# Patient Record
Sex: FEMALE | Race: BLACK OR AFRICAN AMERICAN | NOT HISPANIC OR LATINO
[De-identification: names, ages, dates, MRNs, and addresses within clinical notes are randomized per-mention and may not be internally consistent; named-entity substitution may affect disease eponyms.]

---

## 2021-08-03 ENCOUNTER — NON-APPOINTMENT (OUTPATIENT)
Age: 41
End: 2021-08-03

## 2021-08-03 PROBLEM — Z00.00 ENCOUNTER FOR PREVENTIVE HEALTH EXAMINATION: Status: ACTIVE | Noted: 2021-08-03

## 2021-08-09 ENCOUNTER — APPOINTMENT (OUTPATIENT)
Dept: PEDIATRIC MEDICAL GENETICS | Facility: CLINIC | Age: 41
End: 2021-08-09
Payer: COMMERCIAL

## 2021-08-09 DIAGNOSIS — N96 RECURRENT PREGNANCY LOSS: ICD-10-CM

## 2021-08-09 PROCEDURE — ZZZZZ: CPT

## 2021-08-17 ENCOUNTER — APPOINTMENT (OUTPATIENT)
Dept: ANTEPARTUM | Facility: CLINIC | Age: 41
End: 2021-08-17

## 2023-03-31 ENCOUNTER — OUTPATIENT (OUTPATIENT)
Dept: OUTPATIENT SERVICES | Facility: HOSPITAL | Age: 43
LOS: 1 days | End: 2023-03-31
Payer: COMMERCIAL

## 2023-03-31 VITALS
SYSTOLIC BLOOD PRESSURE: 130 MMHG | RESPIRATION RATE: 18 BRPM | HEART RATE: 105 BPM | DIASTOLIC BLOOD PRESSURE: 86 MMHG | HEIGHT: 62 IN | WEIGHT: 173.06 LBS | TEMPERATURE: 98 F | OXYGEN SATURATION: 96 %

## 2023-03-31 DIAGNOSIS — Z98.890 OTHER SPECIFIED POSTPROCEDURAL STATES: Chronic | ICD-10-CM

## 2023-03-31 DIAGNOSIS — Z34.90 ENCOUNTER FOR SUPERVISION OF NORMAL PREGNANCY, UNSPECIFIED, UNSPECIFIED TRIMESTER: ICD-10-CM

## 2023-03-31 DIAGNOSIS — O34.29 MATERNAL CARE DUE TO UTERINE SCAR FROM OTHER PREVIOUS SURGERY: ICD-10-CM

## 2023-03-31 PROCEDURE — 86901 BLOOD TYPING SEROLOGIC RH(D): CPT

## 2023-03-31 PROCEDURE — 86900 BLOOD TYPING SEROLOGIC ABO: CPT

## 2023-03-31 PROCEDURE — 83036 HEMOGLOBIN GLYCOSYLATED A1C: CPT

## 2023-03-31 PROCEDURE — 86850 RBC ANTIBODY SCREEN: CPT

## 2023-03-31 PROCEDURE — G0463: CPT

## 2023-03-31 PROCEDURE — 85027 COMPLETE CBC AUTOMATED: CPT

## 2023-03-31 PROCEDURE — 80048 BASIC METABOLIC PNL TOTAL CA: CPT

## 2023-03-31 RX ORDER — CHLORHEXIDINE GLUCONATE 213 G/1000ML
1 SOLUTION TOPICAL ONCE
Refills: 0 | Status: DISCONTINUED | OUTPATIENT
Start: 2023-04-19 | End: 2023-04-22

## 2023-03-31 RX ORDER — SODIUM CHLORIDE 9 MG/ML
1000 INJECTION, SOLUTION INTRAVENOUS
Refills: 0 | Status: DISCONTINUED | OUTPATIENT
Start: 2023-04-19 | End: 2023-04-19

## 2023-03-31 NOTE — OB PST NOTE - HISTORY OF PRESENT ILLNESS
42yr old female  EDC 2023 coming in for primary  due to previous myomectomy. Pt states her gestational DM is diet controlled. Denies HTN or complications feels well has acid reflux, No sig med hx. Had covid 2022 fever and body aches.    Note; covid test 2023 [facility]

## 2023-03-31 NOTE — OB PST NOTE - ASSESSMENT
CAPRINI SCORE [CLOT]    AGE RELATED RISK FACTORS                                                       MOBILITY RELATED FACTORS  [x ] Age 41-60 years                                            (1 Point)                  [ ] Bed rest                                                        (1 Point)  [ ] Age: 61-74 years                                           (2 Points)                 [ ] Plaster cast                                                   (2 Points)  [ ] Age= 75 years                                              (3 Points)                 [ ] Bed bound for more than 72 hours                 (2 Points)    DISEASE RELATED RISK FACTORS                                               GENDER SPECIFIC FACTORS  [ ] Edema in the lower extremities                       (1 Point)                  [x ] Pregnancy                                                     (1 Point)  [ ] Varicose veins                                               (1 Point)                  [ ] Post-partum < 6 weeks                                   (1 Point)             [x ] BMI > 25 Kg/m2                                            (1 Point)                  [ ] Hormonal therapy  or oral contraception          (1 Point)                 [ ] Sepsis (in the previous month)                        (1 Point)                  [ ] History of pregnancy complications                 (1 point)  [ ] Pneumonia or serious lung disease                                               [ ] Unexplained or recurrent                     (1 Point)           (in the previous month)                               (1 Point)  [ ] Abnormal pulmonary function test                     (1 Point)                 SURGERY RELATED RISK FACTORS  [ ] Acute myocardial infarction                              (1 Point)                 x[ ]  Section                                             (1 Point)  [ ] Congestive heart failure (in the previous month)  (1 Point)               [ ] Minor surgery                                                  (1 Point)   [ ] Inflammatory bowel disease                             (1 Point)                 [ ] Arthroscopic surgery                                        (2 Points)  [ ] Central venous access                                      (2 Points)                [ ] General surgery lasting more than 45 minutes   (2 Points)       [ ] Stroke (in the previous month)                          (5 Points)               [ ] Elective arthroplasty                                         (5 Points)                                                                                                                                               HEMATOLOGY RELATED FACTORS                                                 TRAUMA RELATED RISK FACTORS  [ ] Prior episodes of VTE                                     (3 Points)                [ ] Fracture of the hip, pelvis, or leg                       (5 Points)  [ ] Positive family history for VTE                         (3 Points)                 [ ] Acute spinal cord injury (in the previous month)  (5 Points)  [ ] Prothrombin 76734 A                                     (3 Points)                 [ ] Paralysis  (less than 1 month)                             (5 Points)  [ ] Factor V Leiden                                             (3 Points)                  [ ] Multiple Trauma within 1 month                        (5 Points)  [ ] Lupus anticoagulants                                     (3 Points)                                                           [ ] Anticardiolipin antibodies                               (3 Points)                                                       [ ] High homocysteine in the blood                      (3 Points)                                             [ ] Other congenital or acquired thrombophilia      (3 Points)                                                [ ] Heparin induced thrombocytopenia                  (3 Points)                                          Total Score [   3       ]    Caprini Score 0 - 2:  Low Risk, No VTE Prophylaxis required for most patients, encourage ambulation  Caprini Score 3 - 6:  At Risk, pharmacologic VTE prophylaxis is indicated for most patients (in the absence of a contraindication)  Caprini Score Greater than or = 7:  High Risk, pharmacologic VTE prophylaxis is indicated for most patients (in the absence of a contraindication)

## 2023-03-31 NOTE — OB PST NOTE - NSICDXPASTSURGICALHX_GEN_ALL_CORE_FT
PAST SURGICAL HISTORY:  History of D&C     History of myomectomy     History of repair of ACL     [place] product of in vitro fertilization (IVF) pregnancy

## 2023-04-01 ENCOUNTER — TRANSCRIPTION ENCOUNTER (OUTPATIENT)
Age: 43
End: 2023-04-01

## 2023-04-18 ENCOUNTER — TRANSCRIPTION ENCOUNTER (OUTPATIENT)
Age: 43
End: 2023-04-18

## 2023-04-19 ENCOUNTER — INPATIENT (INPATIENT)
Facility: HOSPITAL | Age: 43
LOS: 2 days | Discharge: HOME | End: 2023-04-22
Attending: OBSTETRICS & GYNECOLOGY | Admitting: OBSTETRICS & GYNECOLOGY
Payer: COMMERCIAL

## 2023-04-19 VITALS — HEART RATE: 100 BPM | DIASTOLIC BLOOD PRESSURE: 85 MMHG | SYSTOLIC BLOOD PRESSURE: 133 MMHG

## 2023-04-19 DIAGNOSIS — Z98.890 OTHER SPECIFIED POSTPROCEDURAL STATES: Chronic | ICD-10-CM

## 2023-04-19 DIAGNOSIS — O34.29 MATERNAL CARE DUE TO UTERINE SCAR FROM OTHER PREVIOUS SURGERY: ICD-10-CM

## 2023-04-19 LAB — GLUCOSE BLDC GLUCOMTR-MCNC: 74 MG/DL — SIGNIFICANT CHANGE UP (ref 70–99)

## 2023-04-19 PROCEDURE — 88307 TISSUE EXAM BY PATHOLOGIST: CPT | Mod: 26

## 2023-04-19 PROCEDURE — 59514 CESAREAN DELIVERY ONLY: CPT | Mod: AS,U7

## 2023-04-19 DEVICE — INTERCEED 3 X 4": Type: IMPLANTABLE DEVICE | Status: FUNCTIONAL

## 2023-04-19 RX ORDER — ACETAMINOPHEN 500 MG
1000 TABLET ORAL ONCE
Refills: 0 | Status: COMPLETED | OUTPATIENT
Start: 2023-04-19 | End: 2023-04-19

## 2023-04-19 RX ORDER — OXYCODONE HYDROCHLORIDE 5 MG/1
10 TABLET ORAL
Refills: 0 | Status: DISCONTINUED | OUTPATIENT
Start: 2023-04-19 | End: 2023-04-20

## 2023-04-19 RX ORDER — NALOXONE HYDROCHLORIDE 4 MG/.1ML
0.1 SPRAY NASAL
Refills: 0 | Status: DISCONTINUED | OUTPATIENT
Start: 2023-04-19 | End: 2023-04-20

## 2023-04-19 RX ORDER — ACETAMINOPHEN 500 MG
975 TABLET ORAL
Refills: 0 | Status: DISCONTINUED | OUTPATIENT
Start: 2023-04-19 | End: 2023-04-22

## 2023-04-19 RX ORDER — FAMOTIDINE 10 MG/ML
20 INJECTION INTRAVENOUS ONCE
Refills: 0 | Status: COMPLETED | OUTPATIENT
Start: 2023-04-19 | End: 2023-04-19

## 2023-04-19 RX ORDER — LANOLIN
1 OINTMENT (GRAM) TOPICAL EVERY 6 HOURS
Refills: 0 | Status: DISCONTINUED | OUTPATIENT
Start: 2023-04-19 | End: 2023-04-22

## 2023-04-19 RX ORDER — IBUPROFEN 200 MG
600 TABLET ORAL EVERY 6 HOURS
Refills: 0 | Status: DISCONTINUED | OUTPATIENT
Start: 2023-04-19 | End: 2023-04-22

## 2023-04-19 RX ORDER — DEXAMETHASONE 0.5 MG/5ML
4 ELIXIR ORAL EVERY 6 HOURS
Refills: 0 | Status: DISCONTINUED | OUTPATIENT
Start: 2023-04-19 | End: 2023-04-20

## 2023-04-19 RX ORDER — SODIUM CHLORIDE 9 MG/ML
1000 INJECTION, SOLUTION INTRAVENOUS
Refills: 0 | Status: DISCONTINUED | OUTPATIENT
Start: 2023-04-19 | End: 2023-04-22

## 2023-04-19 RX ORDER — OXYCODONE HYDROCHLORIDE 5 MG/1
5 TABLET ORAL
Refills: 0 | Status: DISCONTINUED | OUTPATIENT
Start: 2023-04-19 | End: 2023-04-20

## 2023-04-19 RX ORDER — TETANUS TOXOID, REDUCED DIPHTHERIA TOXOID AND ACELLULAR PERTUSSIS VACCINE, ADSORBED 5; 2.5; 8; 8; 2.5 [IU]/.5ML; [IU]/.5ML; UG/.5ML; UG/.5ML; UG/.5ML
0.5 SUSPENSION INTRAMUSCULAR ONCE
Refills: 0 | Status: DISCONTINUED | OUTPATIENT
Start: 2023-04-19 | End: 2023-04-22

## 2023-04-19 RX ORDER — SIMETHICONE 80 MG/1
80 TABLET, CHEWABLE ORAL EVERY 4 HOURS
Refills: 0 | Status: DISCONTINUED | OUTPATIENT
Start: 2023-04-19 | End: 2023-04-22

## 2023-04-19 RX ORDER — MAGNESIUM HYDROXIDE 400 MG/1
30 TABLET, CHEWABLE ORAL
Refills: 0 | Status: DISCONTINUED | OUTPATIENT
Start: 2023-04-19 | End: 2023-04-22

## 2023-04-19 RX ORDER — OXYTOCIN 10 UNIT/ML
333.33 VIAL (ML) INJECTION
Qty: 20 | Refills: 0 | Status: COMPLETED | OUTPATIENT
Start: 2023-04-19 | End: 2023-04-19

## 2023-04-19 RX ORDER — HEPARIN SODIUM 5000 [USP'U]/ML
5000 INJECTION INTRAVENOUS; SUBCUTANEOUS EVERY 12 HOURS
Refills: 0 | Status: DISCONTINUED | OUTPATIENT
Start: 2023-04-19 | End: 2023-04-22

## 2023-04-19 RX ORDER — CITRIC ACID/SODIUM CITRATE 300-500 MG
15 SOLUTION, ORAL ORAL ONCE
Refills: 0 | Status: COMPLETED | OUTPATIENT
Start: 2023-04-19 | End: 2023-04-19

## 2023-04-19 RX ORDER — KETOROLAC TROMETHAMINE 30 MG/ML
30 SYRINGE (ML) INJECTION ONCE
Refills: 0 | Status: DISCONTINUED | OUTPATIENT
Start: 2023-04-19 | End: 2023-04-19

## 2023-04-19 RX ORDER — NALBUPHINE HYDROCHLORIDE 10 MG/ML
2.5 INJECTION, SOLUTION INTRAMUSCULAR; INTRAVENOUS; SUBCUTANEOUS EVERY 6 HOURS
Refills: 0 | Status: DISCONTINUED | OUTPATIENT
Start: 2023-04-19 | End: 2023-04-20

## 2023-04-19 RX ORDER — OXYTOCIN 10 UNIT/ML
333.33 VIAL (ML) INJECTION
Qty: 20 | Refills: 0 | Status: DISCONTINUED | OUTPATIENT
Start: 2023-04-19 | End: 2023-04-22

## 2023-04-19 RX ORDER — MORPHINE SULFATE 50 MG/1
0.1 CAPSULE, EXTENDED RELEASE ORAL ONCE
Refills: 0 | Status: DISCONTINUED | OUTPATIENT
Start: 2023-04-19 | End: 2023-04-20

## 2023-04-19 RX ORDER — CEFAZOLIN SODIUM 1 G
2000 VIAL (EA) INJECTION ONCE
Refills: 0 | Status: COMPLETED | OUTPATIENT
Start: 2023-04-19 | End: 2023-04-19

## 2023-04-19 RX ORDER — BUTORPHANOL TARTRATE 2 MG/ML
0.12 INJECTION, SOLUTION INTRAMUSCULAR; INTRAVENOUS EVERY 6 HOURS
Refills: 0 | Status: DISCONTINUED | OUTPATIENT
Start: 2023-04-19 | End: 2023-04-20

## 2023-04-19 RX ORDER — IBUPROFEN 200 MG
600 TABLET ORAL EVERY 6 HOURS
Refills: 0 | Status: COMPLETED | OUTPATIENT
Start: 2023-04-19 | End: 2024-03-17

## 2023-04-19 RX ORDER — DIPHENHYDRAMINE HCL 50 MG
25 CAPSULE ORAL EVERY 6 HOURS
Refills: 0 | Status: DISCONTINUED | OUTPATIENT
Start: 2023-04-19 | End: 2023-04-22

## 2023-04-19 RX ORDER — SODIUM CHLORIDE 9 MG/ML
1000 INJECTION, SOLUTION INTRAVENOUS ONCE
Refills: 0 | Status: COMPLETED | OUTPATIENT
Start: 2023-04-19 | End: 2023-04-19

## 2023-04-19 RX ORDER — ONDANSETRON 8 MG/1
4 TABLET, FILM COATED ORAL EVERY 6 HOURS
Refills: 0 | Status: DISCONTINUED | OUTPATIENT
Start: 2023-04-19 | End: 2023-04-20

## 2023-04-19 RX ADMIN — Medication 15 MILLILITER(S): at 10:19

## 2023-04-19 RX ADMIN — Medication 1000 MILLIUNIT(S)/MIN: at 14:54

## 2023-04-19 RX ADMIN — FAMOTIDINE 20 MILLIGRAM(S): 10 INJECTION INTRAVENOUS at 10:19

## 2023-04-19 RX ADMIN — ONDANSETRON 4 MILLIGRAM(S): 8 TABLET, FILM COATED ORAL at 12:53

## 2023-04-19 RX ADMIN — Medication 600 MILLIGRAM(S): at 20:50

## 2023-04-19 RX ADMIN — Medication 400 MILLIGRAM(S): at 14:55

## 2023-04-19 RX ADMIN — ONDANSETRON 4 MILLIGRAM(S): 8 TABLET, FILM COATED ORAL at 20:20

## 2023-04-19 RX ADMIN — Medication 30 MILLIGRAM(S): at 12:53

## 2023-04-19 RX ADMIN — Medication 600 MILLIGRAM(S): at 20:20

## 2023-04-19 RX ADMIN — SODIUM CHLORIDE 2000 MILLILITER(S): 9 INJECTION, SOLUTION INTRAVENOUS at 10:19

## 2023-04-19 RX ADMIN — HEPARIN SODIUM 5000 UNIT(S): 5000 INJECTION INTRAVENOUS; SUBCUTANEOUS at 20:19

## 2023-04-19 RX ADMIN — SODIUM CHLORIDE 125 MILLILITER(S): 9 INJECTION, SOLUTION INTRAVENOUS at 10:19

## 2023-04-19 NOTE — OB PROVIDER H&P - HISTORY OF PRESENT ILLNESS
42yr old female  EDC 2023 coming in for primary  due to previous myomectomy. Pt states her gestational DM is diet controlled. Denies HTN or complications feels well has acid reflux, No sig med hx. Had covid 2022 fever and body aches.    Note; covid test 2023 [facility]  -----------------------------------------  PA Note:  42y  @38wks and 4 days gestation presenting for scheduled pLTCS due to prior myomectomy in 2018. She reports feeling irregular cramping. She denies LOF or VB. PNC c/b GDMA1. +FM. GBS +. EFW 6#9 done by ultrasound last week. To note, this is an IVF pregnancy.     POBHx: SAB x2 s/p D&C x1  PGYNHx: Denies ovarian cysts, abnormal pap smears. STD's  PMHx: Denies  Medications: PNV, ASA  Allergies: NKDA  PShx: Lsc myomectomy in 2018, R ACL repair   Social Hx: Denies etoh/tobacco/drug use. Denies anxiety/depression    Vital Signs Last 24 Hrs  T(C): --  T(F): --  HR: 88 (2023 09:52) (82 - 100)  BP: 133/85 (2023 09:09) (133/85 - 133/85)  BP(mean): --  RR: --  SpO2: 99% (2023 09:52) (97% - 100%)    General: NAD, A&Ox3  CV: RRR  Lungs: CTA b/l  Abdomen: Soft, NT, gravid    VE: Deferred  EFM: 130/moderate variability/+accels/no decels  Ashkum: Irregular

## 2023-04-19 NOTE — OB PROVIDER H&P - NSHPPHYSICALEXAM_GEN_ALL_CORE
Vital Signs Last 24 Hrs  T(C): --  T(F): --  HR: 88 (19 Apr 2023 09:52) (82 - 100)  BP: 133/85 (19 Apr 2023 09:09) (133/85 - 133/85)  BP(mean): --  RR: --  SpO2: 99% (19 Apr 2023 09:52) (97% - 100%)    General: NAD, A&Ox3  CV: RRR  Lungs: CTA b/l  Abdomen: Soft, NT, gravid

## 2023-04-19 NOTE — OB PROVIDER DELIVERY SUMMARY - NSPROVIDERDELIVERYNOTE_OBGYN_ALL_OB_FT
pLTCS for prior myomectomy performed in the usual fashion  Viable male infant, vertex presentation, apgars 9/9, weight 6#15  Focal accreta noted in posterior wall of the uterus. Box stitch with Monocryl placed  Methergine x1 given   Hysterotomy then closed in 1 layer using PDS  Interceed and fibrillar placed over hysterotomy  Excellent hemostasis noted  Grossly normal uterus, tubes and ovaries  Abdomen closed in standard fashion  Skin closed with insorb with prineo dressing   Pt and infant to recovery in stable condition  Placenta to pathology  Count correct x3    QBL: 331cc  UOP: 350cc  IVF: 2000cc    Dictation #:    Performed with surgeon Dr. Preet Colmenares PA-C pLTCS sec to hx prior myomectomy, performed in the usual fashion.  Viable male infant, vertex presentation, apgars 9/9, weight 6#15  Focal accreta noted in posterior wall of the uterus. Box stitch with Monocryl placed  Methergine x1 given   Hysterotomy then closed in 1 layer using PDS  Interceed and fibrillar placed over hysterotomy  Excellent hemostasis noted  Grossly normal uterus, tubes and ovaries  Abdomen closed in standard fashion  Skin closed with insorb with prineo dressing   Pt and infant to recovery in stable condition  Placenta to pathology  Count correct x3    QBL: 331cc  UOP: 350cc  IVF: 2000cc    Dictation #:    Performed with surgeon Dr. Preet Colmenares PA-C pLTCS sec to hx prior myomectomy, performed in the usual fashion.  Viable male infant, vertex presentation, apgars 9/9, weight 6#15  Focal accreta noted in posterior wall of the uterus. Box stitch with Monocryl placed  Methergine x1 given   Hysterotomy then closed in 1 layer using PDS  Interceed and fibrillar placed over hysterotomy  Excellent hemostasis noted  Grossly normal uterus, tubes and ovaries  Abdomen closed in standard fashion  Skin closed with insorb with prineo dressing   Pt and infant to recovery in stable condition  Placenta to pathology  Count correct x3    QBL: 331cc  UOP: 350cc  IVF: 2000cc    Dictation #: [id number]    Performed with surgeon Dr. Preet Colmenares PA-C

## 2023-04-19 NOTE — OB RN DELIVERY SUMMARY - NS_SEPSISRSKCALC_OBGYN_ALL_OB_FT
EOS calculated successfully. EOS Risk Factor: 0.5/1000 live births ([facility] national incidence); GA=38w4d; Temp=97.9; ROM=0.017; GBS='Positive'; Antibiotics='No antibiotics or any antibiotics < 2 hrs prior to birth'

## 2023-04-19 NOTE — OB PROVIDER H&P - ASSESSMENT
A/P:  42y  @38wks and 4 days gestation presenting for scheduled pLTCS. +FM. GBS +. EFW 3000g  -Admit to L&D  -Routine labs  -EFM/[place]  -NPO, IVF, Bicitra  -Anesthesia consult  -For pLTCS  Dr. Oviedo in house  Anne Marie Colmenares PA-C

## 2023-04-19 NOTE — OB RN PATIENT PROFILE - FUNCTIONAL ASSESSMENT - BASIC MOBILITY 6.
4-calculated by average/Not able to assess (calculate score using [facility] averaging method)

## 2023-04-19 NOTE — OB RN INTRAOPERATIVE NOTE - NSSELHIDDEN_OBGYN_ALL_OB_FT
[NS_DeliveryAttending1_OBGYN_ALL_OB_FT:[id number]],[NS_DeliveryAssist1_OBGYN_ALL_OB_FT:[id number]=],[NS_DeliveryAssist2_OBGYN_ALL_OB_FT:[id number]=],[NS_DeliveryRN_OBGYN_ALL_OB_FT:[id number]]

## 2023-04-19 NOTE — OB PROVIDER DELIVERY SUMMARY - NSSELHIDDEN_OBGYN_ALL_OB_FT
[NS_DeliveryAttending1_OBGYN_ALL_OB_FT:[id number]=],[NS_DeliveryAssist1_OBGYN_ALL_OB_FT:[id number]=]

## 2023-04-19 NOTE — OB PROVIDER H&P - ATTENDING COMMENTS
41 yo nullip at term adm for elective prim C/S sec to hx myomectomy.  APC compl by GDMA1 well controlled, no sig PMHx.    Pros/cons of a LEXA in setting of a previous laparoscopic myomectomy of a large fibroid have been reviewed in detail, and pt elected to proceed to prim C/S.  Risks of C/S reviewed in detail, plan anesthesia consult for CSE, then proceed to OR.  WIL Oviedo

## 2023-04-19 NOTE — OB RN PATIENT PROFILE - NSICDXPASTSURGICALHX_GEN_ALL_CORE_FT
PAST SURGICAL HISTORY:  History of D&C     History of myomectomy     History of repair of ACL     [place] product of in vitro fertilization (IVF) pregnancy

## 2023-04-19 NOTE — OB RN DELIVERY SUMMARY - NSSELHIDDEN_OBGYN_ALL_OB_FT
[NS_DeliveryAttending1_OBGYN_ALL_OB_FT:[id number]=],[NS_DeliveryAssist1_OBGYN_ALL_OB_FT:[id number]=],[NS_DeliveryAssist2_OBGYN_ALL_OB_FT:[id number]=],[NS_DeliveryRN_OBGYN_ALL_OB_FT:[id number]==]

## 2023-04-19 NOTE — OB PROVIDER H&P - NSICDXPASTSURGICALHX_GEN_ALL_CORE_FT
PAST SURGICAL HISTORY:  History of D&C     History of myomectomy     History of repair of ACL     [place] product of in vitro fertilization (IVF) pregnancy

## 2023-04-20 LAB
BASOPHILS # BLD AUTO: 0.06 K/UL — SIGNIFICANT CHANGE UP (ref 0–0.2)
BASOPHILS NFR BLD AUTO: 0.4 % — SIGNIFICANT CHANGE UP (ref 0–2)
EOSINOPHIL # BLD AUTO: 0.08 K/UL — SIGNIFICANT CHANGE UP (ref 0–0.5)
EOSINOPHIL NFR BLD AUTO: 0.5 % — SIGNIFICANT CHANGE UP (ref 0–6)
HCT VFR BLD CALC: 37.6 % — SIGNIFICANT CHANGE UP (ref 34.5–45)
HGB BLD-MCNC: 12.4 G/DL — SIGNIFICANT CHANGE UP (ref 11.5–15.5)
IMM GRANULOCYTES NFR BLD AUTO: 0.5 % — SIGNIFICANT CHANGE UP (ref 0–0.9)
LYMPHOCYTES # BLD AUTO: 16.9 % — SIGNIFICANT CHANGE UP (ref 13–44)
LYMPHOCYTES # BLD AUTO: 2.46 K/UL — SIGNIFICANT CHANGE UP (ref 1–3.3)
MCHC RBC-ENTMCNC: 32.5 PG — SIGNIFICANT CHANGE UP (ref 27–34)
MCHC RBC-ENTMCNC: 33 GM/DL — SIGNIFICANT CHANGE UP (ref 32–36)
MCV RBC AUTO: 98.7 FL — SIGNIFICANT CHANGE UP (ref 80–100)
MONOCYTES # BLD AUTO: 0.99 K/UL — HIGH (ref 0–0.9)
MONOCYTES NFR BLD AUTO: 6.8 % — SIGNIFICANT CHANGE UP (ref 2–14)
NEUTROPHILS # BLD AUTO: 10.9 K/UL — HIGH (ref 1.8–7.4)
NEUTROPHILS NFR BLD AUTO: 74.9 % — SIGNIFICANT CHANGE UP (ref 43–77)
NRBC # BLD: 0 /100 WBCS — SIGNIFICANT CHANGE UP (ref 0–0)
PLATELET # BLD AUTO: 284 K/UL — SIGNIFICANT CHANGE UP (ref 150–400)
RBC # BLD: 3.81 M/UL — SIGNIFICANT CHANGE UP (ref 3.8–5.2)
RBC # FLD: 13.3 % — SIGNIFICANT CHANGE UP (ref 10.3–14.5)
WBC # BLD: 14.56 K/UL — HIGH (ref 3.8–10.5)
WBC # FLD AUTO: 14.56 K/UL — HIGH (ref 3.8–10.5)

## 2023-04-20 RX ORDER — KETOROLAC TROMETHAMINE 30 MG/ML
30 SYRINGE (ML) INJECTION ONCE
Refills: 0 | Status: DISCONTINUED | OUTPATIENT
Start: 2023-04-20 | End: 2023-04-20

## 2023-04-20 RX ADMIN — Medication 975 MILLIGRAM(S): at 00:36

## 2023-04-20 RX ADMIN — Medication 600 MILLIGRAM(S): at 21:32

## 2023-04-20 RX ADMIN — Medication 975 MILLIGRAM(S): at 05:53

## 2023-04-20 RX ADMIN — Medication 600 MILLIGRAM(S): at 16:00

## 2023-04-20 RX ADMIN — Medication 600 MILLIGRAM(S): at 22:00

## 2023-04-20 RX ADMIN — Medication 975 MILLIGRAM(S): at 01:06

## 2023-04-20 RX ADMIN — Medication 30 MILLIGRAM(S): at 09:23

## 2023-04-20 RX ADMIN — Medication 600 MILLIGRAM(S): at 15:39

## 2023-04-20 RX ADMIN — HEPARIN SODIUM 5000 UNIT(S): 5000 INJECTION INTRAVENOUS; SUBCUTANEOUS at 22:12

## 2023-04-20 RX ADMIN — Medication 975 MILLIGRAM(S): at 17:16

## 2023-04-20 RX ADMIN — SIMETHICONE 80 MILLIGRAM(S): 80 TABLET, CHEWABLE ORAL at 21:33

## 2023-04-20 RX ADMIN — Medication 975 MILLIGRAM(S): at 06:23

## 2023-04-20 RX ADMIN — Medication 30 MILLIGRAM(S): at 10:00

## 2023-04-20 RX ADMIN — Medication 975 MILLIGRAM(S): at 12:00

## 2023-04-20 RX ADMIN — HEPARIN SODIUM 5000 UNIT(S): 5000 INJECTION INTRAVENOUS; SUBCUTANEOUS at 10:50

## 2023-04-20 RX ADMIN — Medication 975 MILLIGRAM(S): at 11:14

## 2023-04-20 NOTE — PROVIDER CONTACT NOTE (OTHER) - ASSESSMENT
Pt AXOX4, denies chest pain, SOB, and palpitations. C/o of "lightheadedness here and there, it's better than before."

## 2023-04-21 RX ORDER — OXYCODONE HYDROCHLORIDE 5 MG/1
5 TABLET ORAL
Refills: 0 | Status: COMPLETED | OUTPATIENT
Start: 2023-04-21 | End: 2023-04-28

## 2023-04-21 RX ORDER — OXYCODONE HYDROCHLORIDE 5 MG/1
5 TABLET ORAL
Refills: 0 | Status: DISCONTINUED | OUTPATIENT
Start: 2023-04-21 | End: 2023-04-22

## 2023-04-21 RX ORDER — OXYCODONE HYDROCHLORIDE 5 MG/1
5 TABLET ORAL ONCE
Refills: 0 | Status: DISCONTINUED | OUTPATIENT
Start: 2023-04-21 | End: 2023-04-22

## 2023-04-21 RX ADMIN — Medication 975 MILLIGRAM(S): at 12:59

## 2023-04-21 RX ADMIN — Medication 600 MILLIGRAM(S): at 21:15

## 2023-04-21 RX ADMIN — Medication 975 MILLIGRAM(S): at 14:00

## 2023-04-21 RX ADMIN — Medication 600 MILLIGRAM(S): at 08:09

## 2023-04-21 RX ADMIN — Medication 975 MILLIGRAM(S): at 18:03

## 2023-04-21 RX ADMIN — Medication 600 MILLIGRAM(S): at 16:25

## 2023-04-21 RX ADMIN — Medication 600 MILLIGRAM(S): at 15:22

## 2023-04-21 RX ADMIN — HEPARIN SODIUM 5000 UNIT(S): 5000 INJECTION INTRAVENOUS; SUBCUTANEOUS at 21:46

## 2023-04-21 RX ADMIN — Medication 975 MILLIGRAM(S): at 00:16

## 2023-04-21 RX ADMIN — Medication 975 MILLIGRAM(S): at 06:08

## 2023-04-21 RX ADMIN — OXYCODONE HYDROCHLORIDE 5 MILLIGRAM(S): 5 TABLET ORAL at 23:33

## 2023-04-21 RX ADMIN — Medication 975 MILLIGRAM(S): at 23:15

## 2023-04-21 RX ADMIN — Medication 975 MILLIGRAM(S): at 06:40

## 2023-04-21 RX ADMIN — Medication 975 MILLIGRAM(S): at 19:05

## 2023-04-21 RX ADMIN — Medication 600 MILLIGRAM(S): at 09:10

## 2023-04-21 RX ADMIN — Medication 975 MILLIGRAM(S): at 00:45

## 2023-04-21 RX ADMIN — HEPARIN SODIUM 5000 UNIT(S): 5000 INJECTION INTRAVENOUS; SUBCUTANEOUS at 10:00

## 2023-04-21 RX ADMIN — Medication 600 MILLIGRAM(S): at 20:15

## 2023-04-21 NOTE — CHART NOTE - NSCHARTNOTEFT_GEN_A_CORE
Patient seen for: nutrition consult for GDM postpartum education prior to discharge    Source: patient, electronic medical record     Patient is: ; GDM [A1]    Chart reviewed, events noted. Meds/Labs reviewed.    Anthropometrics as per pt profile:  Height: 5'2" inches  Pre-pregnancy weight: 150 pounds   Weight gain: 23 pounds   Admit/Dosing wt: 173.9 pounds     Nutrition Diagnosis:   Food and Nutrition Related Knowledge Deficit related to limited previous exposure to postpartum GDM nutrition education and recommendations as evidenced by GDM postpartum.    Goal: Pt to state at least two teach back points.    Intervention:  1. Education: Pt educated on postpartum dietary recommendations, including risk of development of T2DM; reinforced importance of DM screening 4-12 weeks postpartum. Reviewed nutrition recommendations for breast feeding, including adequate protein, calorie, and fluid intake.   2. Handout: "What to expect now that you have had your baby"     Monitoring:  No other nutrition risks were identified during this encounter.  RD remains available upon request and will follow up per protocol.  Ludivina Colmenares RD CDN #[phone] or Teams (preferred)

## 2023-04-22 ENCOUNTER — TRANSCRIPTION ENCOUNTER (OUTPATIENT)
Age: 43
End: 2023-04-22

## 2023-04-22 VITALS
DIASTOLIC BLOOD PRESSURE: 76 MMHG | HEART RATE: 72 BPM | OXYGEN SATURATION: 97 % | SYSTOLIC BLOOD PRESSURE: 128 MMHG | TEMPERATURE: 99 F | RESPIRATION RATE: 18 BRPM

## 2023-04-22 PROCEDURE — 82962 GLUCOSE BLOOD TEST: CPT

## 2023-04-22 PROCEDURE — 86850 RBC ANTIBODY SCREEN: CPT

## 2023-04-22 PROCEDURE — 36415 COLL VENOUS BLD VENIPUNCTURE: CPT

## 2023-04-22 PROCEDURE — 86901 BLOOD TYPING SEROLOGIC RH(D): CPT

## 2023-04-22 PROCEDURE — 59025 FETAL NON-STRESS TEST: CPT

## 2023-04-22 PROCEDURE — 88307 TISSUE EXAM BY PATHOLOGIST: CPT

## 2023-04-22 PROCEDURE — 59050 FETAL MONITOR W/REPORT: CPT

## 2023-04-22 PROCEDURE — 86900 BLOOD TYPING SEROLOGIC ABO: CPT

## 2023-04-22 PROCEDURE — 86780 TREPONEMA PALLIDUM: CPT

## 2023-04-22 PROCEDURE — C1765: CPT

## 2023-04-22 PROCEDURE — 85025 COMPLETE CBC W/AUTO DIFF WBC: CPT

## 2023-04-22 PROCEDURE — 86769 SARS-COV-2 COVID-19 ANTIBODY: CPT

## 2023-04-22 RX ORDER — CALCIUM CARBONATE 500(1250)
2 TABLET ORAL
Refills: 0 | DISCHARGE

## 2023-04-22 RX ORDER — OMEGA-3 ACID ETHYL ESTERS 1 G
0 CAPSULE ORAL
Refills: 0 | DISCHARGE

## 2023-04-22 RX ORDER — IBUPROFEN 200 MG
1 TABLET ORAL
Qty: 0 | Refills: 0 | DISCHARGE
Start: 2023-04-22

## 2023-04-22 RX ORDER — ASPIRIN/CALCIUM CARB/MAGNESIUM 324 MG
1 TABLET ORAL
Refills: 0 | DISCHARGE

## 2023-04-22 RX ADMIN — OXYCODONE HYDROCHLORIDE 5 MILLIGRAM(S): 5 TABLET ORAL at 00:15

## 2023-04-22 RX ADMIN — Medication 975 MILLIGRAM(S): at 12:07

## 2023-04-22 RX ADMIN — Medication 975 MILLIGRAM(S): at 06:25

## 2023-04-22 RX ADMIN — Medication 975 MILLIGRAM(S): at 05:25

## 2023-04-22 RX ADMIN — Medication 600 MILLIGRAM(S): at 12:06

## 2023-04-22 RX ADMIN — Medication 975 MILLIGRAM(S): at 00:15

## 2023-04-22 RX ADMIN — Medication 600 MILLIGRAM(S): at 08:18

## 2023-04-22 NOTE — PROGRESS NOTE ADULT - PROBLEM SELECTOR PLAN 1
Increase OOB  DVT ppx  Regular diet  Routine Postpartum/Post-op care
Increase OOB  DVT ppx  Regular diet  Routine Postpartum/Post-op care
Increase OOB  Regular diet  PO Pain Protocol  Continue routine postop care

## 2023-04-22 NOTE — DISCHARGE NOTE OB - MATERIALS PROVIDED
Vaccinations/[facility]  Screening Program/  Immunization Record/Breastfeeding Log/Bottle Feeding Log/Breastfeeding Mother’s Support Group Information/Guide to Postpartum Care/[facility] Hearing Screen Program/Back To Sleep Handout/Shaken Baby Prevention Handout/Breastfeeding Guide and Packet/Birth Certificate Instructions

## 2023-04-22 NOTE — PROGRESS NOTE ADULT - ASSESSMENT
A/P: 42y   S/P primary  section for prior myomectomy,  POD # 2, doing well    PAST MEDICAL & SURGICAL HISTORY:    History of myomectomy    History of D&C    History of repair of ACL  
A/P: 42y   S/P primary  section for prior myomectomy,  POD # 1, doing well    PAST MEDICAL & SURGICAL HISTORY:    History of myomectomy    History of D&C    History of repair of ACL  
42y POD#3 s/p pLTCS, doing well.

## 2023-04-22 NOTE — DISCHARGE NOTE OB - NS MD DC FALL RISK RISK
For information on Fall & Injury Prevention, visit: [email or web address] OR  [email or web address] OR  https://www.cdc.gov/steadi/patient.html

## 2023-04-22 NOTE — DISCHARGE NOTE OB - PATIENT PORTAL LINK FT
You can access the FollowMyHealth Patient Portal offered by [facility] by registering at the following website: [email or web address]. By joining [employer]’s FollowMyHealth portal, you will also be able to view your health information using other applications (apps) compatible with our system.

## 2023-04-22 NOTE — DISCHARGE NOTE OB - CARE PROVIDER_API CALL
Luis Fernando Saleem)  Obstetrics and Gynecology  [address], [place] [address]  Phone: [phone]  Fax: [phone]  Follow Up Time:

## 2023-04-22 NOTE — PROGRESS NOTE ADULT - SUBJECTIVE AND OBJECTIVE BOX
Day 1 of Anesthesia Pain Management Service    SUBJECTIVE:  Pain Scale Score:          [X] Refer to charted pain scores    THERAPY: Received PF spinal morphine as above    OBJECTIVE:    Sedation:        	[X] Alert	[ ] Drowsy	[ ] Arousable      [ ] Asleep       [ ] Unresponsive    Side Effects:	[X] None	[ ] Nausea	[ ] Vomiting         [ ] Pruritus  		[ ] Weakness            [ ] Numbness	          [ ] Other:    ASSESSMENT/ PLAN  [X] Patient transitioned to prn analgesics  [X] Pain management per primary service, pain service to sign off   [X]Documentation and Verification of current medications
Day 1 of Anesthesia Pain Management Service    SUBJECTIVE: Doing ok  Pain Scale Score:          [X] Refer to charted pain scores    THERAPY:    s/p    100 mcg PF morphine on 4\19\2023      MEDICATIONS  (STANDING):  acetaminophen     Tablet .. 975 milliGRAM(s) Oral <User Schedule>  chlorhexidine 2% Cloths 1 Application(s) Topical once  diphtheria/tetanus/pertussis (acellular) Vaccine (Adacel) 0.5 milliLiter(s) IntraMuscular once  heparin   Injectable 5000 Unit(s) SubCutaneous every 12 hours  ibuprofen  Tablet. 600 milliGRAM(s) Oral every 6 hours  lactated ringers. 1000 milliLiter(s) (125 mL/Hr) IV Continuous <Continuous>  morphine PF Spinal 0.1 milliGRAM(s) IntraThecal. once  oxytocin Infusion 333.333 milliUNIT(s)/Min (1000 mL/Hr) IV Continuous <Continuous>    MEDICATIONS  (PRN):  butorphanol Injectable 0.125 milliGRAM(s) IV Push every 6 hours PRN Pruritus  dexAMETHasone  Injectable 4 milliGRAM(s) IV Push every 6 hours PRN Nausea  diphenhydrAMINE 25 milliGRAM(s) Oral every 6 hours PRN Pruritus  lanolin Ointment 1 Application(s) Topical every 6 hours PRN Sore Nipples  magnesium hydroxide Suspension 30 milliLiter(s) Oral two times a day PRN Constipation  nalbuphine Injectable 2.5 milliGRAM(s) IV Push every 6 hours PRN Pruritus  naloxone Injectable 0.1 milliGRAM(s) IV Push every 3 minutes PRN For ANY of the following changes in patient status:  A. Breaths Per Minute LESS THAN 10, B. Oxygen saturation LESS THAN 90%, C. Sedation score of 6 for Stop After: 4 Times  ondansetron Injectable 4 milliGRAM(s) IV Push every 6 hours PRN Nausea  oxyCODONE    IR 5 milliGRAM(s) Oral every 3 hours PRN Mild Pain (1 - 3)  oxyCODONE    IR 10 milliGRAM(s) Oral every 3 hours PRN Moderate Pain (4 - 6)  simethicone 80 milliGRAM(s) Chew every 4 hours PRN Gas      OBJECTIVE:    Sedation:        	[X] Alert	 [ ] Drowsy	[ ] Arousable      [ ] Asleep       [ ] Unresponsive    Side Effects:	[X] None 	[ ] Nausea	[ ] Vomiting         [ ] Pruritus  		[ ] Weakness            [ ] Numbness	          [ ] Other:    Vital Signs Last 24 Hrs  T(C): 37 (20 Apr 2023 08:40), Max: 37.4 (20 Apr 2023 00:37)  T(F): 98.6 (20 Apr 2023 08:40), Max: 99.3 (20 Apr 2023 00:37)  HR: 78 (20 Apr 2023 08:40) (66 - 90)  BP: 125/61 (20 Apr 2023 08:40) (114/62 - 138/75)  BP(mean): 94 (19 Apr 2023 15:40) (80 - 96)  RR: 18 (20 Apr 2023 08:40) (15 - 31)  SpO2: 97% (20 Apr 2023 08:40) (95% - 98%)    Parameters below as of 20 Apr 2023 08:40  Patient On (Oxygen Delivery Method): room air        ASSESSMENT/ PLAN  [X] Patient transitioned to prn analgesics  [X] Pain management per primary service, pain service to sign off   [X]Documentation and Verification of current medications
Postpartum Note,  Section   ATTENDING NOTE Post-operative day 2    Subjective:  The patient feels well.  She is ambulating.   She is tolerating regular diet.  She denies nausea and vomiting.  She is voiding.  Her pain is controlled.  She reports normal postpartum bleeding    Physical exam:    Vital Signs Last 24 Hrs  T(C): 36.8 (2023 06:00), Max: 37.2 (2023 17:12)  T(F): 98.3 (2023 06:00), Max: 99 (2023 17:12)  HR: 71 (2023 06:00) (71 - 78)  BP: 127/74 (2023 06:00) (119/74 - 127/74)  BP(mean): --  RR: 18 (2023 06:00) (18 - 18)  SpO2: 97% (2023 06:00) (97% - 98%)    Parameters below as of 2023 06:00  Patient On (Oxygen Delivery Method): room air        Gen: NAD  Breast: Soft, nontender, not engorged.  Abdomen: Soft, nontender, no distension , firm uterine fundus at umbilicus.  Incision: Clean, dry, and intact  Pelvic: Normal lochia noted  Ext: No calf tenderness    LABS:                        12.4   14.56 )-----------( 284      ( 2023 06:03 )             37.6                   Allergies    No Known Allergies    Intolerances      MEDICATIONS  (STANDING):  acetaminophen     Tablet .. 975 milliGRAM(s) Oral <User Schedule>  chlorhexidine 2% Cloths 1 Application(s) Topical once  diphtheria/tetanus/pertussis (acellular) Vaccine (Adacel) 0.5 milliLiter(s) IntraMuscular once  heparin   Injectable 5000 Unit(s) SubCutaneous every 12 hours  ibuprofen  Tablet. 600 milliGRAM(s) Oral every 6 hours  lactated ringers. 1000 milliLiter(s) (125 mL/Hr) IV Continuous <Continuous>  oxytocin Infusion 333.333 milliUNIT(s)/Min (1000 mL/Hr) IV Continuous <Continuous>    MEDICATIONS  (PRN):  diphenhydrAMINE 25 milliGRAM(s) Oral every 6 hours PRN Pruritus  lanolin Ointment 1 Application(s) Topical every 6 hours PRN Sore Nipples  magnesium hydroxide Suspension 30 milliLiter(s) Oral two times a day PRN Constipation  simethicone 80 milliGRAM(s) Chew every 4 hours PRN Gas        Assessment and Plan  POD # 2  s/p  section. Stable.  Encourage ambulation  Analgesia prn  Regular diet as tolerated            
Postpartum Note-  Section POD#3    Allergies: No Known Allergies    Blood type: B positive  Rubella: Immune  RPR: Negative     S: Patient is a 42y  POD#3 s/p pLTCS.   Patient w/o complaints, pain is controlled.  Pt is OOB, tolerating PO, passing flatus. Lochia WNL.     O:  Vital Signs Last 24 Hrs  T(C): 37.1 (2023 05:30), Max: 37.2 (2023 21:02)  T(F): 98.7 (2023 05:30), Max: 99 (2023 21:02)  HR: 72 (2023 05:30) (72 - 76)  BP: 128/76 (2023 05:30) (122/75 - 128/76)  BP(mean): --  RR: 18 (2023 05:30) (18 - 18)  SpO2: 97% (2023 05:30) (97% - 98%)    Parameters below as of 2023 05:30  Patient On (Oxygen Delivery Method): room air     Gen: NAD  Abdomen: Soft, nontender, non-distended, fundus firm.  Incision: Clean, dry, and intact. Negative erythema/edema/ecchymosis. Sub Q  Lochia WNL  Ext:  Neg Edema, Neg Calf tenderness      A/P:  42y POD#3 s/p pLTCS, doing well.     PMHx: Hx of myomectomy  Current Issues: None    Increase OOB  Regular diet  PO Pain Protocol  Continue routine postop care    Anne Marie Colmenares PA-C      
 Postpartum Note-  Section POD#2    Allergies: No Known Allergies    RPR Negative  Blood Type  B  Positive  Rubella Immune    S:Patient is a  42y   POD#2  S/P  primary C/Sec  Patient w/o complaints, pain is controlled.  Pt is OOB, tolerating PO, passing flatus. Lochia WNL.    Feeding: Breast    O:  Vital Signs Last 24 Hrs  T(C): 36.8 (2023 06:00), Max: 37.2 (2023 17:12)  T(F): 98.3 (2023 06:00), Max: 99 (2023 17:12)  HR: 71 (2023 06:00) (71 - 78)  BP: 127/74 (2023 06:00) (119/74 - 127/74)  BP(mean): --  RR: 18 (2023 06:00) (18 - 18)  SpO2: 97% (2023 06:00) (97% - 98%)    Parameters below as of 2023 06:00  Patient On (Oxygen Delivery Method): room air        Gen: NAD  Abdomen: Soft, nontender, non-distended, fundus firm.  Incision: Clean, dry, and intact.  Negative erythema/edema/ecchymosis   Sub Q/Pirneo  Lochia WNL  Ext: SCDs in place. Negative Edema. Negative tenderness    LABS:             12.4   14.56 )-----------( 284      ( 2023 06:03 )             37.6               
 Postpartum Note-  Section POD#1    Allergies: No Known Allergies    RPR Negative  Blood Type  B  Positive  Rubella Immune    S:Patient is a  42y   POD#1 S/P  primary C/Sec  Patient w/o complaints, pain is controlled.  Pt is OOB, tolerating PO, passing flatus. Lochia WNL.    Feeding: Breast    O:  Vital Signs Last 24 Hrs  T(C): 36.5 (2023 05:20), Max: 37.4 (2023 00:37)  T(F): 97.7 (2023 05:20), Max: 99.3 (2023 00:37)  HR: 76 (2023 05:20) (66 - 100)  BP: 138/75 (2023 05:20) (114/62 - 138/75)  BP(mean): 94 (2023 15:40) (80 - 96)  RR: 18 (2023 05:20) (15 - 31)  SpO2: 98% (2023 05:20) (95% - 100%)    Gen: NAD  Abdomen: Soft, nontender, non-distended, fundus firm.  Incision: Clean, dry, and intact.  Negative erythema/edema/ecchymosis   Sub Q/Pirneo  Lochia WNL  Ext: SCDs in place. Negative Edema. Negative tenderness    LABS:             12.4   14.56 )-----------( 284      ( 2023 06:03 )             37.6

## 2023-05-19 LAB — SURGICAL PATHOLOGY STUDY: SIGNIFICANT CHANGE UP

## 2024-06-25 NOTE — OB PST NOTE - TERM DELIVERIES, OB PROFILE
Patient called back and thought you were calling in a Rx for Requip for restless legs please advise  
0

## 2025-06-02 ENCOUNTER — APPOINTMENT (OUTPATIENT)
Dept: MATERNAL FETAL MEDICINE | Facility: CLINIC | Age: 45
End: 2025-06-02
Payer: COMMERCIAL

## 2025-06-02 ENCOUNTER — ASOB RESULT (OUTPATIENT)
Age: 45
End: 2025-06-02

## 2025-06-02 ENCOUNTER — APPOINTMENT (OUTPATIENT)
Dept: ANTEPARTUM | Facility: CLINIC | Age: 45
End: 2025-06-02
Payer: COMMERCIAL

## 2025-06-02 ENCOUNTER — LABORATORY RESULT (OUTPATIENT)
Age: 45
End: 2025-06-02

## 2025-06-02 PROCEDURE — 59015 CHORION BIOPSY: CPT

## 2025-06-02 PROCEDURE — 99204 OFFICE O/P NEW MOD 45 MIN: CPT | Mod: 25

## 2025-06-02 PROCEDURE — ZZZZZ: CPT

## 2025-06-02 PROCEDURE — 76945 ECHO GUIDE VILLUS SAMPLING: CPT

## 2025-06-03 ENCOUNTER — APPOINTMENT (OUTPATIENT)
Dept: PEDIATRIC CARDIOLOGY | Facility: CLINIC | Age: 45
End: 2025-06-03
Payer: COMMERCIAL

## 2025-06-03 PROCEDURE — 93325 DOPPLER ECHO COLOR FLOW MAPG: CPT | Mod: 59

## 2025-06-03 PROCEDURE — 76827 ECHO EXAM OF FETAL HEART: CPT

## 2025-06-03 PROCEDURE — 99203 OFFICE O/P NEW LOW 30 MIN: CPT | Mod: 25

## 2025-06-03 PROCEDURE — 76825 ECHO EXAM OF FETAL HEART: CPT

## 2025-06-03 PROCEDURE — 99213 OFFICE O/P EST LOW 20 MIN: CPT | Mod: 25

## 2025-06-03 PROCEDURE — 76820 UMBILICAL ARTERY ECHO: CPT

## 2025-06-03 PROCEDURE — 76821 MIDDLE CEREBRAL ARTERY ECHO: CPT

## 2025-06-23 ENCOUNTER — ASOB RESULT (OUTPATIENT)
Age: 45
End: 2025-06-23

## 2025-06-23 ENCOUNTER — APPOINTMENT (OUTPATIENT)
Dept: MATERNAL FETAL MEDICINE | Facility: CLINIC | Age: 45
End: 2025-06-23
Payer: COMMERCIAL

## 2025-06-23 PROCEDURE — 99214 OFFICE O/P EST MOD 30 MIN: CPT | Mod: 25,95

## 2025-06-25 ENCOUNTER — APPOINTMENT (OUTPATIENT)
Dept: ANTEPARTUM | Facility: CLINIC | Age: 45
End: 2025-06-25

## 2025-06-25 ENCOUNTER — ASOB RESULT (OUTPATIENT)
Age: 45
End: 2025-06-25

## 2025-06-25 PROCEDURE — 76805 OB US >/= 14 WKS SNGL FETUS: CPT

## 2025-07-16 ENCOUNTER — APPOINTMENT (OUTPATIENT)
Dept: PEDIATRIC CARDIOLOGY | Facility: CLINIC | Age: 45
End: 2025-07-16
Payer: COMMERCIAL

## 2025-07-16 PROCEDURE — 76828 ECHO EXAM OF FETAL HEART: CPT

## 2025-07-16 PROCEDURE — 76826 ECHO EXAM OF FETAL HEART: CPT

## 2025-07-16 PROCEDURE — 93325 DOPPLER ECHO COLOR FLOW MAPG: CPT | Mod: 59

## 2025-07-16 PROCEDURE — 76821 MIDDLE CEREBRAL ARTERY ECHO: CPT

## 2025-07-16 PROCEDURE — 99213 OFFICE O/P EST LOW 20 MIN: CPT | Mod: 25

## 2025-07-16 PROCEDURE — 76820 UMBILICAL ARTERY ECHO: CPT

## 2025-07-24 ENCOUNTER — APPOINTMENT (OUTPATIENT)
Dept: ANTEPARTUM | Facility: CLINIC | Age: 45
End: 2025-07-24
Payer: COMMERCIAL

## 2025-07-24 ENCOUNTER — ASOB RESULT (OUTPATIENT)
Age: 45
End: 2025-07-24

## 2025-07-24 PROCEDURE — 76817 TRANSVAGINAL US OBSTETRIC: CPT

## 2025-07-24 PROCEDURE — 76816 OB US FOLLOW-UP PER FETUS: CPT

## 2025-08-28 ENCOUNTER — APPOINTMENT (OUTPATIENT)
Dept: ANTEPARTUM | Facility: CLINIC | Age: 45
End: 2025-08-28

## 2025-08-28 PROCEDURE — 76816 OB US FOLLOW-UP PER FETUS: CPT

## 2025-08-29 ENCOUNTER — NON-APPOINTMENT (OUTPATIENT)
Age: 45
End: 2025-08-29

## 2025-08-29 DIAGNOSIS — O43.109 MALFORMATION OF PLACENTA, UNSPECIFIED, UNSPECIFIED TRIMESTER: ICD-10-CM

## 2025-09-03 ENCOUNTER — ASOB RESULT (OUTPATIENT)
Age: 45
End: 2025-09-03

## 2025-09-03 ENCOUNTER — APPOINTMENT (OUTPATIENT)
Dept: MATERNAL FETAL MEDICINE | Facility: CLINIC | Age: 45
End: 2025-09-03

## 2025-09-03 DIAGNOSIS — O24.419 GESTATIONAL DIABETES MELLITUS IN PREGNANCY, UNSPECIFIED CONTROL: ICD-10-CM

## 2025-09-03 PROCEDURE — 98961 EDU&TRN PT SLF-MGMT NQHP 2-4: CPT | Mod: 95

## 2025-09-03 RX ORDER — LANCETS 33 GAUGE
EACH MISCELLANEOUS
Qty: 4 | Refills: 2 | Status: ACTIVE | COMMUNITY
Start: 2025-09-03 | End: 1900-01-01

## 2025-09-03 RX ORDER — BLOOD-GLUCOSE METER
W/DEVICE KIT MISCELLANEOUS
Qty: 1 | Refills: 0 | Status: ACTIVE | COMMUNITY
Start: 2025-09-03 | End: 1900-01-01

## 2025-09-03 RX ORDER — URINE ACETONE TEST STRIPS
STRIP MISCELLANEOUS
Qty: 2 | Refills: 2 | Status: ACTIVE | COMMUNITY
Start: 2025-09-03 | End: 1900-01-01

## 2025-09-03 RX ORDER — BLOOD-GLUCOSE METER
KIT MISCELLANEOUS
Qty: 2 | Refills: 2 | Status: ACTIVE | COMMUNITY
Start: 2025-09-03 | End: 1900-01-01

## 2025-09-15 ENCOUNTER — APPOINTMENT (OUTPATIENT)
Dept: MRI IMAGING | Facility: CLINIC | Age: 45
End: 2025-09-15

## 2025-09-18 ENCOUNTER — APPOINTMENT (OUTPATIENT)
Dept: MATERNAL FETAL MEDICINE | Facility: CLINIC | Age: 45
End: 2025-09-18

## 2025-09-18 ENCOUNTER — ASOB RESULT (OUTPATIENT)
Age: 45
End: 2025-09-18

## 2025-09-18 ENCOUNTER — APPOINTMENT (OUTPATIENT)
Dept: ANTEPARTUM | Facility: CLINIC | Age: 45
End: 2025-09-18